# Patient Record
(demographics unavailable — no encounter records)

---

## 2025-07-25 NOTE — HISTORY OF PRESENT ILLNESS
[FreeTextEntry1] : establish care; + quant gold [de-identified] : Pt is a 68 yr old M with pmhx of dyslipidemia, HTN, and active Psoriasis, here to establish care after a + quantiferon gold test. Patient states that he moved here from [place] with his wife to be closer to his son who lives in [place]. Patient has been here since Nov and states that his Psoriasis has recently gotten worse and he has been going to Dr. Ibeth Luna (Psoriasis specialist) who sent him for labs prior to starting biologics. Patient states that during the testing, he was found to have +QUANT GOLD test and was told to go to a PCP for further management. Patient has no family hx of cancer, TB, or heart disease. Reports no night sweats, weight loss, blood sputum, or cough. Patient has no personal hx of diabetes, heart disease, endorses hx of elevated uric acid levels. Of note, patient has been taking medications for HTN that he has brought from [place] consistently and has not been taking BP measurements at home. Has never used drugs, never smoker, and drinks alcohol very rarely. Has never had a colonoscopy but is open to trying cologuard first. Has never had PSA checked before.

## 2025-07-25 NOTE — END OF VISIT
[] : Resident [FreeTextEntry3] : I met with patient and confirmed history and exam as detailed by resident and concur with assessment and plan of care.  New DM noted in labs.   Discussed goal of /80 or less, frequency of BP check, encouraged low salt diet, exercise and weight within BMI 20-25.  Adherence to current medications reinforced. - may need modification in one week

## 2025-07-25 NOTE — PHYSICAL EXAM
[No Acute Distress] : no acute distress [Well-Appearing] : well-appearing [PERRL] : pupils equal round and reactive to light [EOMI] : extraocular movements intact [No Edema] : there was no peripheral edema [Normal] : soft, non-tender, non-distended, no masses palpated, no HSM and normal bowel sounds [de-identified] : Silvery scaly rashes along extensor surface of elbow and onto forearm; non blanching, scaly to touch; some healing,non ulcerated, non leaking

## 2025-07-25 NOTE — HISTORY OF PRESENT ILLNESS
[FreeTextEntry1] : establish care; + quant gold [de-identified] : Pt is a 68 yr old M with pmhx of dyslipidemia, HTN, and active Psoriasis, here to establish care after a + quantiferon gold test. Patient states that he moved here from [place] with his wife to be closer to his son who lives in [place]. Patient has been here since Nov and states that his Psoriasis has recently gotten worse and he has been going to Dr. Ibeth Luna (Psoriasis specialist) who sent him for labs prior to starting biologics. Patient states that during the testing, he was found to have +QUANT GOLD test and was told to go to a PCP for further management. Patient has no family hx of cancer, TB, or heart disease. Reports no night sweats, weight loss, blood sputum, or cough. Patient has no personal hx of diabetes, heart disease, endorses hx of elevated uric acid levels. Of note, patient has been taking medications for HTN that he has brought from [place] consistently and has not been taking BP measurements at home. Has never used drugs, never smoker, and drinks alcohol very rarely. Has never had a colonoscopy but is open to trying cologuard first. Has never had PSA checked before.

## 2025-07-25 NOTE — PHYSICAL EXAM
[No Acute Distress] : no acute distress [Well-Appearing] : well-appearing [PERRL] : pupils equal round and reactive to light [EOMI] : extraocular movements intact [No Edema] : there was no peripheral edema [Normal] : soft, non-tender, non-distended, no masses palpated, no HSM and normal bowel sounds [de-identified] : Silvery scaly rashes along extensor surface of elbow and onto forearm; non blanching, scaly to touch; some healing,non ulcerated, non leaking

## 2025-07-25 NOTE — HEALTH RISK ASSESSMENT
[Good] : ~his/her~  mood as  good [Yes] : Yes [Monthly or less (1 pt)] : Monthly or less (1 point) [1 or 2 (0 pts)] : 1 or 2 (0 points) [Never (0 pts)] : Never (0 points) [No falls in past year] : Patient reported no falls in the past year [0] : 2) Feeling down, depressed, or hopeless: Not at all (0) [Never] : Never [Patient declined colonoscopy] : Patient declined colonoscopy [ColonoscopyComments] : opted for cologuard today